# Patient Record
Sex: FEMALE | Race: WHITE | NOT HISPANIC OR LATINO | Employment: OTHER | URBAN - METROPOLITAN AREA
[De-identification: names, ages, dates, MRNs, and addresses within clinical notes are randomized per-mention and may not be internally consistent; named-entity substitution may affect disease eponyms.]

---

## 2022-01-13 PROBLEM — G43.909 MIGRAINE: Status: ACTIVE | Noted: 2022-01-13

## 2022-01-13 PROBLEM — E66.9 OBESITY: Status: ACTIVE | Noted: 2022-01-13

## 2022-01-13 PROBLEM — E78.2 MIXED HYPERLIPIDEMIA: Status: ACTIVE | Noted: 2022-01-13

## 2022-01-13 PROBLEM — E11.9 TYPE 2 DIABETES MELLITUS: Status: ACTIVE | Noted: 2022-01-13

## 2023-01-16 PROBLEM — E55.9 VITAMIN D DEFICIENCY: Status: ACTIVE | Noted: 2023-01-16

## 2023-01-16 PROBLEM — K21.9 GERD (GASTROESOPHAGEAL REFLUX DISEASE): Status: ACTIVE | Noted: 2023-01-16

## 2024-07-08 NOTE — PROGRESS NOTES
Breast Surgical Oncology  [facility]        PCP:  Everett Barraza MD  Date of Service: 2024    CHIEF COMPLAINT:   At high-risk for breast cancer    DIAGNOSIS:   Jun Renee is a 64 y.o. female who is kindly referred by Dr. Heidi Lang for evaluation of increased risk of breast cancer based on elevated score by a risk assessment model and family history of breast or ovarian cancer.     She began annual screening mammography at age 39. Her detailed family history of breast or ovarian cancer is as follows: paternal grandmother with breast cancer at uknown age,  at unknown age; sister with breast cancer at age 46 (BRCA positive), current age is unknown; paternal aunt with breast caner in her 30s,  at unknown age; paternal aunt with breast cancer in her 50s, current age is unknown; paternal first cousin with breast cancer in her 40s, current age is unknown.  Genetic testing is negative for germline mutation    Today, she denies breast concerns such as pain, masses, skin changes, nipple discharge, nipple retraction or lumps under the arm.     Her breast cancer risk factor profile is as follows: Menarche at 13, Menopause at 47.  She is . Age at first live birth was N/A. HRT: no    Other breast cancer risk factors include family hx on father's side, sister with breast CA, and nulliparous.     FAMILY HISTORY:     Family History   Problem Relation Name Age of Onset    Uterine cancer Mother      Breast cancer Sister  46    BRCA 1/2 Sister      Breast cancer Paternal Grandmother      Breast cancer Paternal Aunt  30    Breast cancer Paternal Aunt  50    Breast cancer Cousin  40        PAST MEDICAL HISTORY:     Past Medical History:   Diagnosis Date    BRCA gene mutation negative     Lifetime Risk 57%    BRCA1 negative     BRCA2 negative     Diabetes        SURGICAL HISTORY:     Past Surgical History:   Procedure Laterality Date    LAPAROTOMY         SOCIAL HISTORY:      Social History     Tobacco Use    Smoking status: Never    Smokeless tobacco: Never        MEDICATIONS/ALLERGIES:     Current Outpatient Medications   Medication Instructions    EScitalopram oxalate (LEXAPRO) 20 MG tablet escitalopram 20 mg tablet   TAKE 1 TABLET BY MOUTH ONCE DAILY    fluconazole (DIFLUCAN) 150 MG Tab Take 1 tablet by mouth now and repeat in 48 hours if symptoms continue    JARDIANCE 25 mg tablet No dose, route, or frequency recorded.    propranoloL (INDERAL) 40 mg, Oral, Daily    rosuvastatin (CRESTOR) 10 MG tablet rosuvastatin 10 mg tablet    ZOLMitriptan (ZOMIG-ZMT) 5 MG disintegrating tablet No dose, route, or frequency recorded.    zolpidem (AMBIEN) 10 mg Tab TAKE 1 TABLET BY MOUTH EVERY DAY AT BEDTIME AS NEEDED FOR SLEEP     Review of patient's allergies indicates:  No Known Allergies    REVIEW OF SYSTEMS:   I have reviewed 12 systems, including 2 points per system. Pertinent positives reported are: anxiety, joint pain/stiffness     PHYSICAL EXAM:   General: The patient appears well and is in no acute distress.     Maggy Rahman MA was present as a chaperone for the examination.   BREAST EXAM  No Asymmetry  Right:  - Mass: No  - Skin change: No  - Nipple Discharge: No  - Nipple retraction: No  - Axillary LAD: No  Left:   - Mass: No  - Skin change: No  - Nipple Discharge: No  - Nipple retraction: No  - Axillary LAD: No    IMAGING:   All images and reports were personally reviewed.     Results for orders placed in visit on 01/18/24    Mammo Digital Screening Bilat w/ Lior    Narrative  Result:  Mammo Digital Screening Bilat w/ Lior    History:  Patient is 63 y.o. and is seen for a screening mammogram.    Films Compared:  Prior images (if available) were compared.    Findings:  This procedure was performed using tomosynthesis. Computer-aided detection was utilized in the interpretation of this examination.  The breasts have scattered areas of fibroglandular density. There is no evidence of  suspicious masses, calcifications, or other abnormal findings.    Impression  Bilateral  There is no mammographic evidence of malignancy.    BI-RADS Category:  Overall: 2 - Benign      Recommendation:  Routine screening mammogram in 1 year is recommended.      PATHOLOGY:   none    ASSESSMENT:     1. At high risk for breast cancer    2. Family history of breast cancer          PLAN:   Jun Renee is a 64 y.o. female who presents for evaluation in the high risk program.  She was identified for the program by her OBGYN due to having a elevated score by a risk assessment model and family history of breast or ovarian cancer.  Her lifetime risk for the development of breast cancer by the Tyrer Cuzick v8 model is 57%.  Therefore, she meets criteria to be followed and screened as a high risk patient.      We reviewed the NCCN guidelines for high risk women to be the following:   Twice annual clinical breast exam  Screening mammogram beginning 10 years earlier than the youngest affected family member  Consideration of supplemental annual imaging alternating with her mammogram on the 6 month interval. We discussed that the guidelines currently include breast MRI as the supplemental imaging option.   Adopting risk-reduction strategies and   Consideration of chemoprevention.      Her individualized plan is the following:  She will see me each July for a clinical breast exam and see Dr. Lang each January for her second annual clinical breast exam.    She will have her mammogram each January.    She would like to proceed with bilateral breast MRI.  This will be done in July.    Regarding risk reduction, she is recommended to maintain a healthy weight (BMI 18-25), regular aerobic exercise (at least 150 minutes/week), balanced healthy diet (high in vegetables, fruits, and whole grains) and avoidance of red meats, processed foods, & refined sugars. , and limit alcohol consumption .   We discussed the findings of the NSABP Prevention  One trial and the potential side effects of tamoxifen. She has been given the [employer] FACTSHEET on Raloxifene to review..    The patient has had genetic testing and based on the results does not warrant further genetic counseling.     Jun Renee has a normal breast exam today. We discussed the possible signs and symptoms of breast cancer as lump, masses, new asymmetries, skin changes and nipple changes. She is encouraged to contact me if any new breast concerns arise.  She has been provided a handout that details today's discussion and her plan.     I spent a total of 45 minutes on this visit. This includes face to face time and non-face to face time preparing to see the patient (eg, review of tests), obtaining and/or reviewing separately obtained history, documenting clinical information in the electronic or other health record, independently interpreting results and communicating results to the patient/family/caregiver, or care coordinator.      Sirena Gutiérrez MD

## 2024-07-09 ENCOUNTER — OFFICE VISIT (OUTPATIENT)
Dept: SURGERY | Facility: CLINIC | Age: 64
End: 2024-07-09
Payer: COMMERCIAL

## 2024-07-09 DIAGNOSIS — Z91.89 AT HIGH RISK FOR BREAST CANCER: Primary | ICD-10-CM

## 2024-07-09 DIAGNOSIS — Z80.3 FAMILY HISTORY OF BREAST CANCER: ICD-10-CM

## 2024-07-09 PROCEDURE — 99204 OFFICE O/P NEW MOD 45 MIN: CPT | Mod: S$GLB,,, | Performed by: SURGERY

## 2024-07-09 PROCEDURE — 99999 PR PBB SHADOW E&M-EST. PATIENT-LVL II: CPT | Mod: PBBFAC,,, | Performed by: SURGERY
